# Patient Record
Sex: FEMALE | Race: WHITE | Employment: FULL TIME | URBAN - METROPOLITAN AREA
[De-identification: names, ages, dates, MRNs, and addresses within clinical notes are randomized per-mention and may not be internally consistent; named-entity substitution may affect disease eponyms.]

---

## 2017-08-18 ENCOUNTER — OFFICE VISIT (OUTPATIENT)
Dept: FAMILY MEDICINE CLINIC | Facility: CLINIC | Age: 27
End: 2017-08-18

## 2017-08-18 ENCOUNTER — TELEPHONE (OUTPATIENT)
Dept: FAMILY MEDICINE CLINIC | Facility: CLINIC | Age: 27
End: 2017-08-18

## 2017-08-18 VITALS
BODY MASS INDEX: 22.83 KG/M2 | RESPIRATION RATE: 20 BRPM | DIASTOLIC BLOOD PRESSURE: 60 MMHG | HEART RATE: 66 BPM | HEIGHT: 67.25 IN | WEIGHT: 147.19 LBS | SYSTOLIC BLOOD PRESSURE: 100 MMHG

## 2017-08-18 DIAGNOSIS — M54.50 ACUTE BILATERAL LOW BACK PAIN WITHOUT SCIATICA: Primary | ICD-10-CM

## 2017-08-18 PROCEDURE — 99203 OFFICE O/P NEW LOW 30 MIN: CPT | Performed by: PHYSICIAN ASSISTANT

## 2017-08-18 RX ORDER — CYCLOBENZAPRINE HCL 10 MG
10 TABLET ORAL 3 TIMES DAILY
Qty: 15 TABLET | Refills: 0 | Status: SHIPPED | OUTPATIENT
Start: 2017-08-18 | End: 2017-09-07

## 2017-08-18 RX ORDER — METHYLPREDNISOLONE 4 MG/1
TABLET ORAL
Qty: 1 KIT | Refills: 0 | Status: SHIPPED | OUTPATIENT
Start: 2017-08-18 | End: 2021-10-25

## 2017-08-18 RX ORDER — NAPROXEN 500 MG/1
500 TABLET ORAL 2 TIMES DAILY WITH MEALS
Qty: 20 TABLET | Refills: 0 | Status: SHIPPED | OUTPATIENT
Start: 2017-08-18 | End: 2021-10-25

## 2017-08-18 NOTE — PATIENT INSTRUCTIONS
Back Care Tips    Caring for your back  These are things you can do to prevent a recurrence of acute back pain and to reduce symptoms from chronic back pain:  · Maintain a healthy weight.  If you are overweight, losing weight will help most types of back · Be careful if you are given prescription pain medicines, narcotics, or medicine for muscle spasm. They can cause drowsiness, affect your coordination, reflexes, and judgment. Do not drive or operate heavy machinery while taking these types of medicines. The best way to sleep is on your side with your knees bent. Put a low pillow under your head to support your neck in a neutral spine position. Avoid thick pillows that bend your neck to one side.  Put a pillow between your legs to further relax your lower b · It can be localized to one spot or area, or it can be more generalized. · It can spread or radiate upwards, to the front, or go down your arms or legs (sciatica). · It can cause muscle spasm.   Most of the time, mechanical problems with the muscles or s · At first, do not try to stretch out the sore spots. If there is a strain, it is not like the good soreness you get after exercising without an injury. In this case, stretching may make it worse. · Avoid prolong sitting, long car rides, or travel.  This p · Be careful if you are given a prescription medicines, narcotics, or medicine for muscle spasms. They can cause drowsiness, affect your coordination, reflexes, and judgement. Do not drive or operate heavy machinery.   Follow-up care  Follow up with your he · You can use ice several times a day. ? Medicines  Over-the-counter pain relievers can include acetaminophen and anti-inflammatory medicines, which includes aspirin or ibuprofen. They can help ease discomfort. Some also reduce swelling.   · Tell your heal

## 2017-08-18 NOTE — PROGRESS NOTES
CC:  Patient presents with:  Back Pain: has had back pain for last 3 days no HX of injury lower back pain to both sides radiates down leg       HPI: Lan Gaston presents for complaints of acute-onset LBP bilaterally without injury.  She has been moving into her acute distress  HEENT:  Head: Normocephalic, atraumatic  Ears: Normal external ears  Nose: No bleeding or rhinorrhea  Eyes: Pupils equal  Cardio: Excellent peripheral perfusion  Pulmonary/Chest: No audible wheezing or labored breathing  Abdomen: No distent Cyclobenzaprine HCl 10 MG Oral Tab 15 tablet 0      Sig: Take 1 tablet (10 mg total) by mouth 3 (three) times daily.

## 2017-08-18 NOTE — TELEPHONE ENCOUNTER
Patient has not yet been seen in our office but Dr. Amish Wilkes is her PCP.  Patient has been having back pain and is not sure if she should be seen here by us or if she needs to see a chiropractor

## 2017-08-18 NOTE — TELEPHONE ENCOUNTER
Pt c/ o lowe back pain x 3 days and pain worse today. Pt states she exercises daily and did not have an injury or pull anything. Pt not sure why back hurts.  Pt never seen here before so unable to give a referral. appt given for today with Daljit Calvillo